# Patient Record
Sex: FEMALE
[De-identification: names, ages, dates, MRNs, and addresses within clinical notes are randomized per-mention and may not be internally consistent; named-entity substitution may affect disease eponyms.]

---

## 2018-08-13 ENCOUNTER — HOSPITAL ENCOUNTER (EMERGENCY)
Dept: HOSPITAL 14 - H.ER | Age: 73
Discharge: HOME | End: 2018-08-13
Payer: MEDICARE

## 2018-08-13 VITALS
SYSTOLIC BLOOD PRESSURE: 140 MMHG | OXYGEN SATURATION: 100 % | DIASTOLIC BLOOD PRESSURE: 71 MMHG | HEART RATE: 78 BPM | RESPIRATION RATE: 18 BRPM | TEMPERATURE: 98 F

## 2018-08-13 DIAGNOSIS — S80.219A: Primary | ICD-10-CM

## 2018-08-13 DIAGNOSIS — M25.562: ICD-10-CM
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
## 2018-08-13 DIAGNOSIS — I10: ICD-10-CM
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
## 2018-08-13 NOTE — RAD
Date of service: 



08/13/2018



PROCEDURE:  Left Knee Radiographs.



HISTORY:

Pain.



COMPARISON:

None.



FINDINGS:



BONES:

Normal. No fracture. 



JOINTS:

Evidence of chondrocalcinosis common normal variant. Mild 

osteoarthritic changes identified. 



JOINT EFFUSION:

None. 



OTHER FINDINGS:

None.



IMPRESSION:

No acute findings related to/accounting  for the clinical 

presentation. Additional benign and/or incidental findings described 

above.

## 2018-08-13 NOTE — ED PDOC
Lower Extremity Pain/Injury


Time Seen by Provider: 08/13/18 16:12


Chief Complaint (Nursing): Lower Extremity Problem/Injury


Chief Complaint (Provider): Lower Extremity Problem/Injury


History Per: Patient


History/Exam Limitations: no limitations


Onset/Duration Of Symptoms: Hrs


Current Symptoms Are (Timing): Still Present


Additional Complaint(s): 


72 y/o female with a PMHx presents to the ED for evaluation of left knee pain s/

p fall this morning. Patient states she walked into a store and tripped over 

the rug, landing on her left knee and left hand. Patient reports localized 

swelling and bruising to the left knee. Patient also reports of sustaining an 

abrasion to the base of her left fifth finger. Patient states she controlled 

the bleeding prior to arrival. In addition, patient states at the time of the 

fall she was experiencing a headache prior to falling and was concerned it was 

because her blood pressure was high. However, patient states headache improved 

upon arrival. Otherwise: (-) head injury, (-) loss of consciousness, (-) 

abdominal pain, (-) chest pain, (-) nausea, (-) vomiting, (-) diarrhea, (-) 

other complaints at this time. 








PMD: Víctor Marin





Tetanus Vaccination not up to date





- Knee


Description Of Injury: Fell





Past Medical History


Reviewed: Historical Data, Nursing Documentation, Vital Signs


Vital Signs: 





 Last Vital Signs











Temp  98.2 F   08/13/18 16:04


 


Pulse  65   08/13/18 16:04


 


Resp  16   08/13/18 16:04


 


BP  134/74   08/13/18 16:04


 


Pulse Ox  95   08/13/18 16:04














- Medical History


PMH: HTN





- Surgical History


Surgical History: Cholecystectomy





- Immunization History


Hx Tetanus Toxoid Vaccination: No





- Home Medications


Home Medications: 


 Ambulatory Orders











 Medication  Instructions  Recorded


 


Acetaminophen [Acetaminophen 8 650 mg PO Q8 PRN #21 tablet.er 08/13/18





Hour]  














- Allergies


Allergies/Adverse Reactions: 


 Allergies











Allergy/AdvReac Type Severity Reaction Status Date / Time


 


No Known Allergies Allergy   Verified 08/13/18 16:03














Review of Systems


ROS Statement: Except As Marked, All Systems Reviewed And Found Negative


Musculoskeletal: Positive for: Leg Pain (left knee)


Skin: Positive for: Other (Abrasion to the left fifth digit)





Physical Exam





- Reviewed


Nursing Documentation Reviewed: Yes


Vital Signs Reviewed: Yes





- Physical Exam


Comments: 





GENERAL APPEARANCE: Patient is awake, alert, oriented x 3, in no acute 

distress. 


SKIN:  Warm, dry; (-) cyanosis.


UPPER EXTREMITY: (+) 3 mm superficial scabbed abrasion to the proximal phalanx 

of the left fifth digit on the fuentes aspect. Full ROM of digits and hand. (-) 

active bleeding. Capillary refill and sensations intact. (-) deformity. Elbow, 

hand and digits:  (-) tenderness.


LOWER EXTREMITY: KNEE:  (+) small effusion, (+) tenderness to the anterior knee

, (+) ecchymosis to the anterior knee. Full ROM with pain on flexion. (-) 

Instability of valgus or varus stress. (-) anterior and posterior draw sign. 


CHEST AND RESPIRATORY:  (+) bilateral expiratory wheezing; (-) rales, (-) 

rhonchi, (-) rub; breath sounds equal bilaterally.


HEART AND CARDIOVASCULAR:  (-) irregularity; (-) murmur, (-) gallop. (+) distal 

pulse.


NEURO AND PSYCH: Mental status as above.





- ECG


O2 Sat by Pulse Oximetry: 95 (RA)


Pulse Ox Interpretation: Normal





Medical Decision Making


Medical Decision Making: 


Time: 1648


Impression: Acute Knee Pain, abrasion s/p fall


Plan:


-- Knees 3 Views LT XR


-- Bacitracin dressing to abrasion after saline irrigation


-- Tylenol 650 mg PO


-- Adacel (10-64 yrs) 0.5 ml IM








Time: 1755


XR RESULTS


FINDINGS:


BONES:


Normal. No fracture. 


JOINTS:


Evidence of chondrocalcinosis common normal variant. Mild osteoarthritic 

changes identified. 


JOINT EFFUSION:


None. 


OTHER FINDINGS:


None.


IMPRESSION:


No acute findings related to/accounting  for the clinical presentation. 

Additional benign and/or incidental findings described above.





1820


On re-evaluation, patient reports improvement of symptoms. On exam, patient 

remains AAOx3, in no acute distress. On exam, neck is supple, lungs CTA, 

cardiac RRR, abdomen is soft and non-tender, neuro exam shows no focal 

findings. VSS, stable for discharge. RICE encouraged.


Diagnostic results d/w the patient in great detail. Dx of acute knee pain/

contusion, skin abrasion s/p fall d/w the patient. 


Based on history, exam and diagnostic results plan will be for discharge and 

PMD follow up.


Advised to follow up with primary care physician in 1-2 days without fail. 

Advised to take medication as prescribed. Return to the emergency room at any 

time for any new or worsening symptoms.


Patient states she fully agrees with and understands discharge instructions. 

States that she agrees with the plan and disposition. Verbalized and repeated 

discharge instructions and plan. I have given the patient opportunity to ask 

any additional questions.


________________________________________________________________________________

__________________


Scribe Attestation:


Documented by Asya Andrea, acting as a scribe for Carmencita Weinstein PA-C.





Provider Scribe Attestation:


All medical record entries made by the Scribe were at my direction and 

personally dictated by me. I have reviewed the chart and agree that the record 

accurately reflects my personal performance of the history, physical exam, 

medical decision making, and the department course for this patient. I have 

also personally directed, reviewed, and agree with the discharge instructions 

and disposition.





Disposition





- Clinical Impression


Clinical Impression: 


 Acute knee pain, Contusion, Skin abrasion








- Patient ED Disposition


Is Patient to be Admitted: No


Counseled Patient/Family Regarding: Studies Performed, Diagnosis, Need For 

Followup, Rx Given





- Disposition


Referrals: 


Víctor Marin MD [Medical Doctor] - 


Disposition: Routine/Home


Disposition Time: 18:21


Condition: STABLE


Additional Instructions: 


The emergency medical care you received today was directed at your acute 

symptoms. If you were prescribed any medication, please fill it and take as 

directed. It may take several days for your symptoms to resolve. Return to the 

Emergency Department if your symptoms worsen, do not improve, or if you have 

any other problems.





Please contact your doctor in 2 days for re-evaluation and follow up / or call 

one of the physicians/clinics you have been referred to that are listed on the 

Patient Visit Information form that is included in your discharge packet. Bring 

any paperwork you were given at discharge with you along with any medications 

you are taking to your follow up visit. Our treatment cannot replace ongoing 

medical care by a primary care provider (PCP) outside of the emergency 

department.


Prescriptions: 


Acetaminophen [Acetaminophen 8 Hour] 650 mg PO Q8 PRN #21 tablet.er


 PRN Reason: Pain, Moderate (4-7)


Instructions:  Knee Pain (DC), Contusion (DC), Skin Abrasions, Wound Care


Forms:  [facility] (English)


Print Language: ENGLISH





- POA


Present On Arrival: Falls Or Trauma